# Patient Record
Sex: FEMALE | Race: BLACK OR AFRICAN AMERICAN | NOT HISPANIC OR LATINO | Employment: FULL TIME | URBAN - METROPOLITAN AREA
[De-identification: names, ages, dates, MRNs, and addresses within clinical notes are randomized per-mention and may not be internally consistent; named-entity substitution may affect disease eponyms.]

---

## 2017-03-05 ENCOUNTER — HOSPITAL ENCOUNTER (EMERGENCY)
Facility: HOSPITAL | Age: 32
Discharge: HOME | End: 2017-03-05
Attending: EMERGENCY MEDICINE
Payer: MEDICAID

## 2017-03-05 VITALS
BODY MASS INDEX: 28.29 KG/M2 | HEART RATE: 68 BPM | TEMPERATURE: 98 F | DIASTOLIC BLOOD PRESSURE: 66 MMHG | SYSTOLIC BLOOD PRESSURE: 126 MMHG | OXYGEN SATURATION: 99 % | RESPIRATION RATE: 18 BRPM | WEIGHT: 170 LBS

## 2017-03-05 DIAGNOSIS — R59.0 LYMPHADENOPATHY OF LEFT CERVICAL REGION: ICD-10-CM

## 2017-03-05 DIAGNOSIS — L03.90 CELLULITIS, UNSPECIFIED CELLULITIS SITE: Primary | ICD-10-CM

## 2017-03-05 LAB
B-HCG UR QL: NEGATIVE
CTP QC/QA: YES

## 2017-03-05 PROCEDURE — 69000 DRG XTRNL EAR ABSC/HEM SMPL: CPT

## 2017-03-05 PROCEDURE — 99284 EMERGENCY DEPT VISIT MOD MDM: CPT | Mod: 25

## 2017-03-05 PROCEDURE — 25000003 PHARM REV CODE 250: Performed by: PHYSICIAN ASSISTANT

## 2017-03-05 PROCEDURE — 81025 URINE PREGNANCY TEST: CPT | Performed by: PHYSICIAN ASSISTANT

## 2017-03-05 PROCEDURE — 10160 PNXR ASPIR ABSC HMTMA BULLA: CPT

## 2017-03-05 RX ORDER — HYDROCODONE BITARTRATE AND ACETAMINOPHEN 5; 325 MG/1; MG/1
1 TABLET ORAL 4 TIMES DAILY PRN
Qty: 8 TABLET | Refills: 0 | Status: SHIPPED | OUTPATIENT
Start: 2017-03-05 | End: 2017-08-25

## 2017-03-05 RX ORDER — SULFAMETHOXAZOLE AND TRIMETHOPRIM 800; 160 MG/1; MG/1
1 TABLET ORAL 2 TIMES DAILY
Qty: 20 TABLET | Refills: 0 | Status: SHIPPED | OUTPATIENT
Start: 2017-03-05 | End: 2017-03-15

## 2017-03-05 RX ORDER — LIDOCAINE HYDROCHLORIDE 10 MG/ML
10 INJECTION, SOLUTION EPIDURAL; INFILTRATION; INTRACAUDAL; PERINEURAL
Status: COMPLETED | OUTPATIENT
Start: 2017-03-05 | End: 2017-03-05

## 2017-03-05 RX ORDER — CEPHALEXIN 500 MG/1
500 CAPSULE ORAL 4 TIMES DAILY
Qty: 40 CAPSULE | Refills: 0 | Status: SHIPPED | OUTPATIENT
Start: 2017-03-05 | End: 2017-03-15

## 2017-03-05 RX ORDER — IBUPROFEN 600 MG/1
600 TABLET ORAL
Status: COMPLETED | OUTPATIENT
Start: 2017-03-05 | End: 2017-03-05

## 2017-03-05 RX ORDER — IBUPROFEN 600 MG/1
600 TABLET ORAL EVERY 8 HOURS PRN
Qty: 20 TABLET | Refills: 0 | Status: SHIPPED | OUTPATIENT
Start: 2017-03-05 | End: 2017-08-25

## 2017-03-05 RX ORDER — HYDROCODONE BITARTRATE AND ACETAMINOPHEN 5; 325 MG/1; MG/1
1 TABLET ORAL
Status: COMPLETED | OUTPATIENT
Start: 2017-03-05 | End: 2017-03-05

## 2017-03-05 RX ORDER — CEPHALEXIN 250 MG/1
500 CAPSULE ORAL
Status: COMPLETED | OUTPATIENT
Start: 2017-03-05 | End: 2017-03-05

## 2017-03-05 RX ORDER — SULFAMETHOXAZOLE AND TRIMETHOPRIM 800; 160 MG/1; MG/1
1 TABLET ORAL
Status: COMPLETED | OUTPATIENT
Start: 2017-03-05 | End: 2017-03-05

## 2017-03-05 RX ADMIN — SULFAMETHOXAZOLE AND TRIMETHOPRIM 1 TABLET: 800; 160 TABLET ORAL at 03:03

## 2017-03-05 RX ADMIN — CEPHALEXIN 500 MG: 250 CAPSULE ORAL at 03:03

## 2017-03-05 RX ADMIN — LIDOCAINE HYDROCHLORIDE 100 MG: 10 INJECTION, SOLUTION EPIDURAL; INFILTRATION; INTRACAUDAL; PERINEURAL at 02:03

## 2017-03-05 RX ADMIN — IBUPROFEN 600 MG: 600 TABLET, FILM COATED ORAL at 01:03

## 2017-03-05 RX ADMIN — HYDROCODONE BITARTRATE AND ACETAMINOPHEN 1 TABLET: 5; 325 TABLET ORAL at 03:03

## 2017-03-05 NOTE — ED AVS SNAPSHOT
[facility] [address] [place] [address]               Sunitha Griffin   3/5/2017 12:32 PM   ED    Description:  Female : 1985   Department:  [facility]           Your Care was Coordinated By:     Provider Role From To    Fabien Coates MD Attending Provider 17 1241 --    DERRICK WildC Physician Assistant 17 1240 --      Reason for Visit     swollen glands           Diagnoses this Visit        Comments    Cellulitis, unspecified cellulitis site    -  Primary     Lymphadenopathy of left cervical region           ED Disposition     None           To Do List           Follow-up Information     Follow up with [facility].    Specialty:  Emergency Medicine    Why:  As needed, If symptoms worsen despite 48 hours of antibiotics     Contact information:    [address] [place] [id number] [phone]        Follow up with Meliton Leyva MD.    Specialty:  Otolaryngology    Why:  for ENT evaluation     Contact information:    [address]  [facility] [address] [phone]         These Medications        Disp Refills Start End    hydrocodone-acetaminophen 5-325mg (NORCO) 5-325 mg per tablet 8 tablet 0 3/5/2017     Take 1 tablet by mouth 4 (four) times daily as needed. - Oral    ibuprofen (ADVIL,MOTRIN) 600 MG tablet 20 tablet 0 3/5/2017     Take 1 tablet (600 mg total) by mouth every 8 (eight) hours as needed for Pain. - Oral    sulfamethoxazole-trimethoprim 800-160mg (BACTRIM DS) 800-160 mg Tab 20 tablet 0 3/5/2017 3/15/2017    Take 1 tablet by mouth 2 (two) times daily. - Oral    cephALEXin (KEFLEX) 500 MG capsule 40 capsule 0 3/5/2017 3/15/2017    Take 1 capsule (500 mg total) by mouth 4 (four) times daily. - Oral      [facility]     [facility] On Call Nurse Care Line -  Assistance  Registered nurses in the [facility] On Call Center provide  clinical advisement, health education, appointment booking, and other advisory services.  Call for this free service at [phone].             Medications           Message regarding Medications     Verify the changes and/or additions to your medication regime listed below are the same as discussed with your clinician today.  If any of these changes or additions are incorrect, please notify your healthcare provider.        START taking these NEW medications        Refills    hydrocodone-acetaminophen 5-325mg (NORCO) 5-325 mg per tablet 0    Sig: Take 1 tablet by mouth 4 (four) times daily as needed.    Class: Print    Route: Oral    ibuprofen (ADVIL,MOTRIN) 600 MG tablet 0    Sig: Take 1 tablet (600 mg total) by mouth every 8 (eight) hours as needed for Pain.    Class: Print    Route: Oral    sulfamethoxazole-trimethoprim 800-160mg (BACTRIM DS) 800-160 mg Tab 0    Sig: Take 1 tablet by mouth 2 (two) times daily.    Class: Print    Route: Oral    cephALEXin (KEFLEX) 500 MG capsule 0    Sig: Take 1 capsule (500 mg total) by mouth 4 (four) times daily.    Class: Print    Route: Oral      These medications were administered today        Dose Freq    ibuprofen tablet 600 mg 600 mg ED 1 Time    Sig: Take 1 tablet (600 mg total) by mouth ED 1 Time.    Class: Normal    Route: Oral    lidocaine (PF) 10 mg/ml (1%) injection 100 mg 10 mL ED 1 Time    Sig: 10 mLs (100 mg total) by Infiltration route ED 1 Time.    Class: Normal    Route: Infiltration    sulfamethoxazole-trimethoprim 800-160mg per tablet 1 tablet 1 tablet ED 1 Time    Sig: Take 1 tablet by mouth ED 1 Time.    Class: Normal    Route: Oral    cephALEXin capsule 500 mg 500 mg ED 1 Time    Sig: Take 2 capsules (500 mg total) by mouth ED 1 Time.    Class: Normal    Route: Oral    hydrocodone-acetaminophen 5-325mg per tablet 1 tablet 1 tablet ED 1 Time    Sig: Take 1 tablet by mouth ED 1 Time.    Class: Normal    Route: Oral      STOP taking these medications      indomethacin (INDOCIN) 50 MG capsule Take 1 capsule (50 mg total) by mouth 3 (three) times daily with meals.    albuterol (PROVENTIL) 2.5 mg /3 mL (0.083 %) nebulizer solution Take 2.5 mg by nebulization every 6 (six) hours as needed.             Verify that the below list of medications is an accurate representation of the medications you are currently taking.  If none reported, the list may be blank. If incorrect, please contact your healthcare provider. Carry this list with you in case of emergency.           Current Medications     cephALEXin (KEFLEX) 500 MG capsule Take 1 capsule (500 mg total) by mouth 4 (four) times daily.    cephALEXin capsule 500 mg Take 2 capsules (500 mg total) by mouth ED 1 Time.    hydrocodone-acetaminophen 5-325mg (NORCO) 5-325 mg per tablet Take 1 tablet by mouth 4 (four) times daily as needed.    hydrocodone-acetaminophen 5-325mg per tablet 1 tablet Take 1 tablet by mouth ED 1 Time.    ibuprofen (ADVIL,MOTRIN) 600 MG tablet Take 1 tablet (600 mg total) by mouth every 8 (eight) hours as needed for Pain.    sulfamethoxazole-trimethoprim 800-160mg (BACTRIM DS) 800-160 mg Tab Take 1 tablet by mouth 2 (two) times daily.    sulfamethoxazole-trimethoprim 800-160mg per tablet 1 tablet Take 1 tablet by mouth ED 1 Time.           Clinical Reference Information           Your Vitals Were     BP Pulse Temp Resp Weight Last Period    126/66 (BP Location: Right arm) 68 97.8 °F (36.6 °C) (Oral) 18 77.1 kg (170 lb) 02/26/2017 (Approximate)    SpO2 BMI             99% 28.29 kg/m2         Allergies as of 3/5/2017     No Known Allergies      Immunizations Administered on Date of Encounter - 3/5/2017     None      ED Micro, Lab, POCT     Start Ordered       Status Ordering Provider    03/05/17 1308 03/05/17 1307  POCT urine pregnancy  Once      Final result       ED Imaging Orders     None      Discharge References/Attachments     SKIN INFECTION, CELLULITIS (ENGLISH)    LYMPHADENOPATHY (ENGLISH)       Smoking Cessation     If you would like to quit smoking:   You may be eligible for free services if you are a [place] resident and started smoking cigarettes before September 1, 1988.  Call the Smoking Cessation [employer] (SCT) toll free at (370) 867-7161 or (371) 105-8016.   Call 1-800-QUIT-NOW if you do not meet the above criteria.             [facility] complies with applicable Federal civil rights laws and does not discriminate on the basis of race, color, national origin, age, disability, or sex.        Language Assistance Services     ATTENTION: Language assistance services are available, free of charge. Please call [phone].      ATENCIÓN: Si habla español, tiene a guillory disposición servicios gratuitos de asistencia lingüística. Llame al [phone].     CHÚ Ý: N?u b?n nói Ti?ng Vi?t, có các d?ch v? h? tr? ngôn ng? mi?n phí dành cho b?n. G?i s? [phone].

## 2017-03-06 NOTE — ED PROVIDER NOTES
Encounter Date: 3/5/2017       History     Chief Complaint   Patient presents with    swollen glands     states on yesterday she woke up with soreness and swelling of neck     Review of patient's allergies indicates:  No Known Allergies  HPI Comments: Sunitha Griffin is a 32 y.o. Female presenting for evaluation of swelling, redness and pain to her left sided neck and ear.  Yesterday, she woke up with some swelling and pain, but states it has progressively worsened.  No fever, no chills.  She has noticed no drainage or bleeding from the area.  No sore throat or inner ear pain.  She hasn't taken any medication for her symptoms.  No headache.  No recent illness.     The history is provided by the patient.     Past Medical History:   Diagnosis Date    Anemia     Asthma      History reviewed. No pertinent surgical history.  History reviewed. No pertinent family history.  Social History   Substance Use Topics    Smoking status: Former Smoker    Smokeless tobacco: None    Alcohol use Yes     Review of Systems   Constitutional: Negative for chills and fever.   HENT: Positive for ear pain and facial swelling. Negative for congestion, ear discharge, rhinorrhea, sinus pressure, sore throat and trouble swallowing.    Respiratory: Negative for cough, chest tightness, shortness of breath and wheezing.    Cardiovascular: Negative for chest pain and palpitations.   Musculoskeletal: Positive for myalgias and neck pain. Negative for arthralgias, back pain, joint swelling and neck stiffness.   Skin: Negative for color change, pallor, rash and wound.   Neurological: Negative for weakness and numbness.   Hematological: Does not bruise/bleed easily.       Physical Exam   Initial Vitals   BP Pulse Resp Temp SpO2   03/05/17 1231 03/05/17 1231 03/05/17 1231 03/05/17 1231 03/05/17 1231   126/66 68 18 97.8 °F (36.6 °C) 99 %     Physical Exam    Nursing note and vitals reviewed.  Constitutional: She appears well-developed and  well-nourished. She is not diaphoretic. No distress.   HENT:   Head: Normocephalic and atraumatic.       Right Ear: Hearing, tympanic membrane, external ear and ear canal normal.   Left Ear: Hearing, tympanic membrane and ear canal normal.   Nose: Nose normal.   Mouth/Throat: Uvula is midline, oropharynx is clear and moist and mucous membranes are normal.   Extensive swelling and erythema with induration noted to left inferior and posterior ear.  No active bleeding or discharge.  Displacement of the ear lobe is noted secondary to swelling.    Eyes: Conjunctivae are normal.   Neck: Normal range of motion. Neck supple.   Cardiovascular: Normal rate, regular rhythm, normal heart sounds and intact distal pulses.   Pulmonary/Chest: Breath sounds normal. No respiratory distress. She has no wheezes. She has no rhonchi. She has no rales.   Musculoskeletal: Normal range of motion. She exhibits tenderness. She exhibits no edema.   Lymphadenopathy:     She has cervical adenopathy.   Neurological: She is alert and oriented to person, place, and time. She has normal strength. No sensory deficit.   Skin: Skin is warm and dry. Abscess noted. No rash noted. There is erythema.         ED Course   I & D - Incision and Drainage  Date/Time: 3/5/2017 9:31 PM  Performed by: SHAUNNA COATES  Authorized by: SHAUNNA COATES   Type: abscess  Body area: head/neck  Location details: left external ear  Anesthesia: local infiltration    Anesthesia:  Anesthesia: local infiltration  Local Anesthetic: lidocaine 1% without epinephrine   Description of findings: 18 guage needle aspiration    Patient tolerance: Patient tolerated the procedure well with no immediate complications        Labs Reviewed   POCT URINE PREGNANCY                   APC / Resident Notes:   Her symptoms are most consistent with cellulitis.  There was an area of possible abscess noted to bedside ultrasound, but needle aspiration, performed by Dr. Coates showed  no purulence.  No I&D necessary.  She will be discharged home on Bactrim and Keflex.  She will follow-up with her PCP for re-evaluation in 2-3 days.  She is given specific return precautions.  She voices understanding and is agreeable to the plan.                ED Course     Clinical Impression:   The primary encounter diagnosis was Cellulitis, unspecified cellulitis site. A diagnosis of Lymphadenopathy of left cervical region was also pertinent to this visit.          Brenda Hernandez PA-C  03/05/17 4158

## 2017-08-25 ENCOUNTER — HOSPITAL ENCOUNTER (EMERGENCY)
Facility: HOSPITAL | Age: 32
Discharge: HOME | End: 2017-08-25
Attending: EMERGENCY MEDICINE
Payer: MEDICAID

## 2017-08-25 VITALS
HEART RATE: 71 BPM | OXYGEN SATURATION: 100 % | SYSTOLIC BLOOD PRESSURE: 116 MMHG | RESPIRATION RATE: 16 BRPM | TEMPERATURE: 98 F | DIASTOLIC BLOOD PRESSURE: 65 MMHG

## 2017-08-25 DIAGNOSIS — L24.9 IRRITANT CONTACT DERMATITIS, UNSPECIFIED TRIGGER: Primary | ICD-10-CM

## 2017-08-25 PROCEDURE — 96374 THER/PROPH/DIAG INJ IV PUSH: CPT

## 2017-08-25 PROCEDURE — 99284 EMERGENCY DEPT VISIT MOD MDM: CPT | Mod: 25

## 2017-08-25 PROCEDURE — 96375 TX/PRO/DX INJ NEW DRUG ADDON: CPT

## 2017-08-25 PROCEDURE — 63600175 PHARM REV CODE 636 W HCPCS: Performed by: EMERGENCY MEDICINE

## 2017-08-25 RX ORDER — DIPHENHYDRAMINE HCL 25 MG
25 CAPSULE ORAL EVERY 6 HOURS PRN
Qty: 20 CAPSULE | Refills: 0 | Status: SHIPPED | OUTPATIENT
Start: 2017-08-25 | End: 2017-08-29

## 2017-08-25 RX ORDER — DIPHENHYDRAMINE HYDROCHLORIDE 50 MG/ML
25 INJECTION INTRAMUSCULAR; INTRAVENOUS
Status: COMPLETED | OUTPATIENT
Start: 2017-08-25 | End: 2017-08-25

## 2017-08-25 RX ORDER — METHYLPREDNISOLONE SOD SUCC 125 MG
125 VIAL (EA) INJECTION
Status: COMPLETED | OUTPATIENT
Start: 2017-08-25 | End: 2017-08-25

## 2017-08-25 RX ADMIN — DIPHENHYDRAMINE HYDROCHLORIDE 25 MG: 50 INJECTION, SOLUTION INTRAMUSCULAR; INTRAVENOUS at 05:08

## 2017-08-25 RX ADMIN — METHYLPREDNISOLONE SODIUM SUCCINATE 125 MG: 125 INJECTION, POWDER, FOR SOLUTION INTRAMUSCULAR; INTRAVENOUS at 05:08

## 2017-08-25 NOTE — ED PROVIDER NOTES
Encounter Date: 8/25/2017    SCRIBE #1 NOTE: I, Laine Casey, am scribing for, and in the presence of, Dr. Marie.       History     Chief Complaint   Patient presents with    Rash     started yesterday       08/25/2017 4:21 PM     Chief Complaint: Rash      Sunitha Griffin is a 32 y.o. female with a history of asthma and anemia who presents to the ED with complains of an itching rash with no associating symptoms. Patient states the rash began yesterday on her abdomen and moved to her arms, bilaterally and to back. She endorses working at [employer]. She denies the use of new detergent, being around new dogs, new people, having new clothes, and new furniture. She also denies any other medical concerns during this ED visit. She has used Benadryl and ointment to relieve itching with no improvements. Patient has no pertinent PSHx and no known drug allergies.       The history is provided by the patient.     Review of patient's allergies indicates:  No Known Allergies  Past Medical History:   Diagnosis Date    Anemia     Asthma      History reviewed. No pertinent surgical history.  History reviewed. No pertinent family history.  Social History   Substance Use Topics    Smoking status: Former Smoker    Smokeless tobacco: Never Used    Alcohol use Yes     Review of Systems   Constitutional: Negative for fever.   HENT: Negative for congestion.    Eyes: Negative for visual disturbance.   Respiratory: Negative for wheezing.    Cardiovascular: Negative for chest pain.   Gastrointestinal: Negative for abdominal pain.   Genitourinary: Negative for dysuria.   Musculoskeletal: Negative for joint swelling.   Skin: Positive for rash.   Neurological: Negative for syncope.   Hematological: Does not bruise/bleed easily.   Psychiatric/Behavioral: Negative for confusion.       Physical Exam     Initial Vitals [08/25/17 1607]   BP Pulse Resp Temp SpO2   118/71 74 15 98.2 °F (36.8 °C) 100 %      MAP       86.67          Physical Exam    Constitutional: She appears well-nourished.   HENT:   Head: Normocephalic and atraumatic.   Eyes: Conjunctivae and EOM are normal.   Neck: Normal range of motion. Neck supple. No thyroid mass present.   Cardiovascular: Normal rate and regular rhythm.   Abdominal: Soft. Normal appearance and bowel sounds are normal. There is no tenderness.   Neurological: She is alert and oriented to person, place, and time. She has normal strength. No cranial nerve deficit or sensory deficit.   Skin: Skin is warm and dry. Rash noted. No erythema.   Blanchin, urticarial rash on chest, back, and bilateral upper extremities.   Psychiatric: She has a normal mood and affect. Her speech is normal. Cognition and memory are normal.         ED Course   Procedures  Labs Reviewed - No data to display          Medical Decision Making:   Initial Assessment:   Patient was interviewed and examined and appears be progressing with evidence of irritation dermatitis.  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  IV was established and she was provided Solu-Medrol, Benadryl with significant improvement in rash eruption and pruritus on reassessment.  ED Management:  The patient was educated about contact dermatitis.  Currently she has no concerning hallmarks associated with rash, including purpura or petechia.  She is asked to monitor these symptoms closely and to try to note the environmental trigger.  She'll be provided prescription for Benadryl and asked to take this as needed.  She is asked to follow-up with a dermatologist or primary care doctor soon as possible regarding improvement.  Additionally asked to return to the ER for any new, concerning, or worsening symptoms.  Patient was agreeable to plan follow-up and she was discharged in stable condition.            Scribe Attestation:   Scribe #1: I performed the above scribed service and the  documentation accurately describes the services I performed. I attest to the accuracy of the note.    Attending Attestation:           Physician Attestation for Scribe:  Physician Attestation Statement for Scribe #1: I, Dr. Marie, reviewed documentation, as scribed by Laine Casey in my presence, and it is both accurate and complete.                 ED Course     Clinical Impression:   The encounter diagnosis was Irritant contact dermatitis, unspecified trigger.    Disposition:   Disposition: Discharged  Condition: Stable                        Dexter Marie MD  08/26/17 9632

## 2017-09-13 ENCOUNTER — HOSPITAL ENCOUNTER (EMERGENCY)
Facility: HOSPITAL | Age: 32
Discharge: HOME | End: 2017-09-13
Attending: EMERGENCY MEDICINE
Payer: MEDICAID

## 2017-09-13 VITALS
RESPIRATION RATE: 18 BRPM | OXYGEN SATURATION: 99 % | HEIGHT: 65 IN | HEART RATE: 79 BPM | DIASTOLIC BLOOD PRESSURE: 74 MMHG | TEMPERATURE: 98 F | BODY MASS INDEX: 28.32 KG/M2 | WEIGHT: 170 LBS | SYSTOLIC BLOOD PRESSURE: 113 MMHG

## 2017-09-13 DIAGNOSIS — L30.9 ECZEMA, UNSPECIFIED TYPE: Primary | ICD-10-CM

## 2017-09-13 PROCEDURE — 99283 EMERGENCY DEPT VISIT LOW MDM: CPT

## 2017-09-13 RX ORDER — TRIAMCINOLONE ACETONIDE 1 MG/G
CREAM TOPICAL 2 TIMES DAILY
Qty: 453.6 G | Refills: 0 | Status: SHIPPED | OUTPATIENT
Start: 2017-09-13 | End: 2017-09-23

## 2017-09-13 NOTE — ED PROVIDER NOTES
"Encounter Date: 9/13/2017       History     Chief Complaint   Patient presents with    Rash     seen in this ED 2 weeks ago for same      Patient is a 32 year old female who presents with rash for over two weeks. She has PMH significant for anemia. She reprots she was seen in the ED and given IV steroids and discharged on bendaryl. She reports the steroids helped for about 12 hours. Since then, the rash has worsened with associated "burning" to the skin. She reports using a steroid cream and over the counter lotions with no improvement. She states it has now spread to her entire body. She denied history of eczema. She denied any known irritant.       The history is provided by the patient.     Review of patient's allergies indicates:  No Known Allergies  Past Medical History:   Diagnosis Date    Anemia     Asthma      History reviewed. No pertinent surgical history.  History reviewed. No pertinent family history.  Social History   Substance Use Topics    Smoking status: Former Smoker    Smokeless tobacco: Never Used    Alcohol use Yes     Review of Systems   Constitutional: Negative for fever.   HENT: Negative for congestion and sore throat.    Respiratory: Negative for cough and shortness of breath.    Cardiovascular: Negative for chest pain.   Gastrointestinal: Negative for abdominal pain, diarrhea, nausea and vomiting.   Genitourinary: Negative for dysuria.   Musculoskeletal: Negative for back pain.   Skin: Positive for rash.   Neurological: Negative for weakness.   Hematological: Does not bruise/bleed easily.       Physical Exam     Initial Vitals [09/13/17 1736]   BP Pulse Resp Temp SpO2   113/74 79 18 98.1 °F (36.7 °C) 99 %      MAP       87         Physical Exam    Nursing note and vitals reviewed.  Constitutional: She appears well-developed and well-nourished. No distress.   HENT:   Head: Normocephalic and atraumatic.   Right Ear: External ear normal.   Left Ear: External ear normal.   Nose: Nose " normal.   Eyes: Conjunctivae are normal. Pupils are equal, round, and reactive to light. Right eye exhibits no discharge. Left eye exhibits no discharge.   Neck: Normal range of motion. Neck supple.   Cardiovascular: Normal rate, regular rhythm and normal heart sounds. Exam reveals no gallop and no friction rub.    No murmur heard.  Pulmonary/Chest: Breath sounds normal. She has no wheezes. She has no rhonchi. She has no rales.   Abdominal: Soft. Bowel sounds are normal. There is no tenderness. There is no guarding.   Musculoskeletal: Normal range of motion.   Neurological: She is alert.   Skin: Skin is warm and dry. Rash noted.   Diffuse rash to body, excluding face, consistent with eczema. No hives noted. No skin sloughing. No petechia or purpura.          ED Course   Procedures  Labs Reviewed - No data to display          Medical Decision Making:   History:   I obtained history from: someone other than patient.  Old Medical Records: I decided to obtain old medical records.       APC / Resident Notes:   This is an urgent evaluation of a 32 year old female who presents with rash for over two weeks that has progressively worsened. Rash consistent with eczema. Not consistent with urticaria. Will try triamcinolone ointment and is instructed to see dermatology. No skin sloughing. No petechia or purpura. No airway compromise.               ED Course      Clinical Impression:   The encounter diagnosis was Eczema, unspecified type.                           Cassie Alcantar PA-C  09/13/17 0205

## 2017-09-13 NOTE — ED NOTES
Pt reports rash over entire body that is unrelieved by OTC medications. Pt states she wants her skin to be taken off. Obvious redness where pt has been scratching all over her body.

## 2018-11-14 ENCOUNTER — HOSPITAL ENCOUNTER (EMERGENCY)
Facility: HOSPITAL | Age: 33
Discharge: HOME | End: 2018-11-14
Attending: EMERGENCY MEDICINE

## 2018-11-14 VITALS
SYSTOLIC BLOOD PRESSURE: 114 MMHG | BODY MASS INDEX: 22.5 KG/M2 | HEART RATE: 61 BPM | DIASTOLIC BLOOD PRESSURE: 78 MMHG | RESPIRATION RATE: 20 BRPM | WEIGHT: 140 LBS | TEMPERATURE: 99 F | HEIGHT: 66 IN | OXYGEN SATURATION: 100 %

## 2018-11-14 DIAGNOSIS — L03.116 LEFT LEG CELLULITIS: Primary | ICD-10-CM

## 2018-11-14 LAB
B-HCG UR QL: NEGATIVE
CTP QC/QA: YES

## 2018-11-14 PROCEDURE — 99283 EMERGENCY DEPT VISIT LOW MDM: CPT

## 2018-11-14 PROCEDURE — 81025 URINE PREGNANCY TEST: CPT | Performed by: PHYSICIAN ASSISTANT

## 2018-11-14 RX ORDER — CEPHALEXIN 500 MG/1
500 CAPSULE ORAL 4 TIMES DAILY
Qty: 28 CAPSULE | Refills: 0 | Status: SHIPPED | OUTPATIENT
Start: 2018-11-14 | End: 2018-11-21

## 2018-11-15 NOTE — DISCHARGE INSTRUCTIONS
Take antibiotics as prescribed.  Take tylenol or motrin as needed for pain.  Follow up with your primary care provider.  For worsening symptoms, chest pain, shortness of breath, increased abdominal pain, high grade fever, stroke or stroke like symptoms, immediately go to the nearest Emergency Room or call 911 as soon as possible.

## 2018-11-15 NOTE — ED NOTES
Discharge instructions, diagnosis, medications, and follow up discussed with patient. Patient verbalized understanding. All questions and concerns answered. No needs expressed at the time. Pt is awake, alert and oriented with no acute distress noted. Respirations even and unlabored. Ambulatory out of ED.

## 2018-11-15 NOTE — ED NOTES
Pt presents to ED c/o lump to upper posterior thigh x 2 weeks. Indurated area noted. No erythema or area of fluctuance noted. AAOx4. Skin otherwise warm and dry. Ambulatory. Updated on POC and reports understanding. Call bell placed at bedside.

## 2018-11-15 NOTE — ED PROVIDER NOTES
"Encounter Date: 11/14/2018    SCRIBE #1 NOTE: I, Caitlin Humberto, am scribing for, and in the presence of, Cassie Alcantar PA-C.       History     Chief Complaint   Patient presents with    Abscess     Rt upper thigh - reports s/s x 2 weeks - states "not coming to a head"/possible cellulitis (not observed in triage)       Time seen by provider: 7:02 PM on 11/14/2018    Sunitha Griffin is a 33 y.o. female who presents to the ED with an onset of a wound on the back of her left thigh that she noticed 2 week sago. Pt explains that it went away and then came back bigger in size. She endorsed a fever, but it has resolved. The patient denies wound drainage or any other symptoms at this time. No pertinent SHx noted. No known drug allergies noted.       The history is provided by the patient.     Review of patient's allergies indicates:  No Known Allergies  Past Medical History:   Diagnosis Date    Anemia     Asthma      History reviewed. No pertinent surgical history.  History reviewed. No pertinent family history.  Social History     Tobacco Use    Smoking status: Former Smoker    Smokeless tobacco: Never Used   Substance Use Topics    Alcohol use: Yes    Drug use: No     Review of Systems   Constitutional: Positive for fever (Resolved). Negative for activity change, appetite change and chills.   HENT: Negative for congestion, rhinorrhea and sore throat.    Eyes: Negative for redness and visual disturbance.   Respiratory: Negative for cough, chest tightness and shortness of breath.    Cardiovascular: Negative for chest pain.   Gastrointestinal: Negative for abdominal pain, diarrhea, nausea and vomiting.   Genitourinary: Negative for dysuria and frequency.   Musculoskeletal: Negative for back pain, neck pain and neck stiffness.   Skin: Positive for wound. Negative for rash.        Negative for wound drainage.   Neurological: Negative for dizziness, syncope, weakness, numbness and headaches.   Hematological: " Does not bruise/bleed easily.       Physical Exam     Initial Vitals [11/14/18 1822]   BP Pulse Resp Temp SpO2   114/78 61 20 98.7 °F (37.1 °C) 100 %      MAP       --         Physical Exam    Nursing note and vitals reviewed.  Constitutional: She appears well-developed and well-nourished. She is cooperative.  Non-toxic appearance. She does not have a sickly appearance.   HENT:   Head: Normocephalic and atraumatic.   Right Ear: External ear normal.   Left Ear: External ear normal.   Nose: Nose normal.   Mouth/Throat: Oropharynx is clear and moist.   Eyes: Conjunctivae and lids are normal. Pupils are equal, round, and reactive to light.   Neck: Normal range of motion and full passive range of motion without pain. Neck supple.   Cardiovascular: Normal rate, regular rhythm and normal heart sounds. Exam reveals no gallop and no friction rub.    No murmur heard.  Pulmonary/Chest: Breath sounds normal. She has no wheezes. She has no rhonchi. She has no rales.   Abdominal: Soft. Normal appearance. There is no tenderness. There is no rigidity, no rebound and no guarding.   Musculoskeletal:        Left upper leg: She exhibits tenderness.   Neurological: She is alert and oriented to person, place, and time.   Skin: Skin is warm, dry and intact. Abscess (Well healing) noted. No rash noted. No erythema.   2 cm area of induration to the left posterior thigh. No significant erythema. Mild tenderness. No fluctuance.          ED Course   Procedures  Labs Reviewed   POCT URINE PREGNANCY          Imaging Results    None          Medical Decision Making:   History:   Old Medical Records: I decided to obtain old medical records.  Clinical Tests:   Lab Tests: Ordered and Reviewed       APC / Resident Notes:   This is an emergent evaluation of a 33 year old female with complaint of abscess to the left posterior thigh. Patient is noted to have a small area of induration and tenderness. No fluctuance. Bedside US showed no sign of fluid  collection or identifiable abscess to drain. No I&D required. Patient was given instructions on wound care. Antibiotics given. Follow up with primary care provider. Return precautions given. All questions answered. Case was discussed with Dr. Oreilly who is in agreement with the plan of care.          Scribe Attestation:   Scribe #1: I performed the above scribed service and the documentation accurately describes the services I performed. I attest to the accuracy of the note.    I, Cassie Alcantar PA-C, personally performed the services described in this documentation. All medical record entries made by the scribe were at my direction and in my presence.  I have reviewed the chart and agree that the record reflects my personal performance and is accurate and complete. Cassie Alcantar PA-C.  11:28 PM 11/20/2018             Clinical Impression:   The encounter diagnosis was Left leg cellulitis.      Disposition:   Disposition: Discharged  Condition: Stable                        Cassie Alcantar PA-C  11/14/18 [id number]       Cassie Alcantar PA-C  11/20/18 6327

## 2019-04-02 ENCOUNTER — HOSPITAL ENCOUNTER (EMERGENCY)
Facility: HOSPITAL | Age: 34
Discharge: HOME | End: 2019-04-02
Attending: EMERGENCY MEDICINE

## 2019-04-02 VITALS
TEMPERATURE: 99 F | DIASTOLIC BLOOD PRESSURE: 72 MMHG | SYSTOLIC BLOOD PRESSURE: 117 MMHG | HEIGHT: 66 IN | HEART RATE: 69 BPM | BODY MASS INDEX: 23.3 KG/M2 | WEIGHT: 145 LBS | OXYGEN SATURATION: 100 % | RESPIRATION RATE: 16 BRPM

## 2019-04-02 DIAGNOSIS — L02.91 ABSCESS: Primary | ICD-10-CM

## 2019-04-02 PROCEDURE — 10060 I&D ABSCESS SIMPLE/SINGLE: CPT

## 2019-04-02 PROCEDURE — 99283 EMERGENCY DEPT VISIT LOW MDM: CPT | Mod: 25

## 2019-04-02 PROCEDURE — 25000003 PHARM REV CODE 250: Performed by: EMERGENCY MEDICINE

## 2019-04-02 RX ORDER — LIDOCAINE HYDROCHLORIDE AND EPINEPHRINE 10; 10 MG/ML; UG/ML
10 INJECTION, SOLUTION INFILTRATION; PERINEURAL
Status: COMPLETED | OUTPATIENT
Start: 2019-04-02 | End: 2019-04-02

## 2019-04-02 RX ORDER — SULFAMETHOXAZOLE AND TRIMETHOPRIM 800; 160 MG/1; MG/1
1 TABLET ORAL 2 TIMES DAILY
Qty: 14 TABLET | Refills: 0 | Status: SHIPPED | OUTPATIENT
Start: 2019-04-02 | End: 2019-04-09

## 2019-04-02 RX ADMIN — LIDOCAINE HYDROCHLORIDE,EPINEPHRINE BITARTRATE 10 ML: 10; .01 INJECTION, SOLUTION INFILTRATION; PERINEURAL at 09:04

## 2019-04-03 NOTE — ED PROVIDER NOTES
Encounter Date: 4/2/2019    SCRIBE #1 NOTE: I, Pineda Springer, am scribing for, and in the presence of, Dr. Hagen.       History     Chief Complaint   Patient presents with    Abscess     left posterior upper thigh     04/02/2019 9:30 PM    The pt is a 34 y.o. female with a past medical history of anemia presenting to the ED with a gradual onset of an acute L thigh pain beginning 1 day ago. The pt stated she was seen in the ED 1 month ago diagnosed with an abscess on the back of her L thigh which resolved with antibiotics. She reported the abscess represented 1 day ago which was painful with yellowish and bloody drainage. The pt stated L thigh pain is exacerbated with sitting and movement. She has a history of cigarette smoking. The pt has no past surgical history on file.    The history is provided by the patient.     Review of patient's allergies indicates:   Allergen Reactions    Iodine and iodide containing products      Past Medical History:   Diagnosis Date    Anemia     Asthma      No past surgical history on file.  No family history on file.  Social History     Tobacco Use    Smoking status: Former Smoker    Smokeless tobacco: Never Used   Substance Use Topics    Alcohol use: Yes    Drug use: No     Review of Systems   Constitutional: Negative for activity change, appetite change, chills, fatigue and fever.   Eyes: Negative for visual disturbance.   Respiratory: Negative for apnea and shortness of breath.    Cardiovascular: Negative for chest pain and palpitations.   Gastrointestinal: Negative for abdominal distention and abdominal pain.   Genitourinary: Negative for difficulty urinating.   Musculoskeletal: Negative for neck pain.        + L thigh pain   Skin: Negative for pallor and rash.   Neurological: Negative for headaches.   Hematological: Does not bruise/bleed easily.   Psychiatric/Behavioral: Negative for agitation.       Physical Exam     Initial Vitals [04/02/19 2036]   BP Pulse Resp Temp SpO2    117/72 69 16 99.1 °F (37.3 °C) 100 %      MAP       --         Physical Exam    Nursing note and vitals reviewed.  Constitutional: She appears well-developed and well-nourished.  Non-toxic appearance. No distress.   HENT:   Head: Normocephalic and atraumatic.   Eyes: EOM are normal. Pupils are equal, round, and reactive to light.   Neck: Normal range of motion. Neck supple. No neck rigidity. No JVD present.   Cardiovascular: Normal rate, regular rhythm, normal heart sounds and intact distal pulses. Exam reveals no gallop and no friction rub.    No murmur heard.  Pulmonary/Chest: Breath sounds normal. She has no wheezes. She has no rhonchi. She has no rales.   Abdominal: Soft. Bowel sounds are normal. She exhibits no distension. There is no tenderness. There is no rigidity, no rebound and no guarding.   Musculoskeletal: Normal range of motion.   Mild tenderness within the gluteal fold   Neurological: She is alert and oriented to person, place, and time. She has normal strength and normal reflexes. No cranial nerve deficit or sensory deficit. She exhibits normal muscle tone. Coordination normal. GCS eye subscore is 4. GCS verbal subscore is 5. GCS motor subscore is 6.   Skin:   1cm x 2cm area of fluctuance within the gluteal fold  No erythema  No induration  No warmth   Psychiatric: She has a normal mood and affect. Her speech is normal and behavior is normal. She is not actively hallucinating.         ED Course   I & D - Incision and Drainage  Date/Time: 4/2/2019 11:46 PM  Performed by: Gene Hagen MD  Authorized by: Gene Hagen MD   Consent Done: Not Needed  Type: abscess  Body area: lower extremity  Location details: left leg  Anesthesia: see MAR for details  Patient sedated: no  Scalpel size: 11  Incision type: single straight  Complexity: simple  Drainage: pus  Drainage amount: moderate  Wound treatment: incision,  expression of material and  deloculation  Complications: No  Patient tolerance:  Patient tolerated the procedure well with no immediate complications        Labs Reviewed - No data to display       Imaging Results    None          Medical Decision Making:   History:   Old Medical Records: I decided to obtain old medical records.  Initial Assessment:   This is an emergent evaluation of a patient with complaints of a skin infection.     I decided to obtain and review the old medical record.    Clinically the patient has an abscess. The patient's abscess has been incised and drained.  The patient will be placed on antibiotics.  The patient has no signs of systemic symptoms or significant cellulitis to warrant admission at this time.  The patient has been instructed to follow up with their regular doctor or the emergency department.  I've given the patient specific return precautions.    Wound care has been discussed.      The results and physical exam findings were reviewed with the patient. Pt agrees with assessment, disposition and treatment plan and has no further questions or complaints at this time.    Gene Hagen M.D. 11:47 PM 4/2/2019                  Scribe Attestation:   Scribe #1: I performed the above scribed service and the documentation accurately describes the services I performed. I attest to the accuracy of the note.    I, Hieu Castano, personally performed the services described in this documentation. All medical record entries made by the scribe were at my direction and in my presence.  I have reviewed the chart and agree that the record reflects my personal performance and is accurate and complete. Gene Hagen MD.  11:48 PM 04/02/2019       DISCLAIMER: This note was prepared with [employer] Flu[employer] Direct voice recognition transcription software. Garbled syntax, mangled pronouns, and other bizarre constructions may be attributed to that software system.             Clinical Impression:       ICD-10-CM ICD-9-CM   1. Abscess L02.91 682.9         Disposition:    Disposition: Discharged  Condition: Stable                        Gene Hagen MD  04/02/19 [id number]

## 2021-06-10 ENCOUNTER — HOSPITAL ENCOUNTER (EMERGENCY)
Facility: HOSPITAL | Age: 36
Discharge: HOME | End: 2021-06-10
Attending: EMERGENCY MEDICINE

## 2021-06-10 VITALS
TEMPERATURE: 98 F | OXYGEN SATURATION: 99 % | RESPIRATION RATE: 17 BRPM | WEIGHT: 155 LBS | BODY MASS INDEX: 25.02 KG/M2 | DIASTOLIC BLOOD PRESSURE: 72 MMHG | HEART RATE: 60 BPM | SYSTOLIC BLOOD PRESSURE: 121 MMHG

## 2021-06-10 DIAGNOSIS — R56.9 SEIZURE: Primary | ICD-10-CM

## 2021-06-10 LAB
ALBUMIN SERPL BCP-MCNC: 4.3 G/DL (ref 3.5–5.2)
ALP SERPL-CCNC: 44 U/L (ref 55–135)
ALT SERPL W/O P-5'-P-CCNC: 12 U/L (ref 10–44)
ANION GAP SERPL CALC-SCNC: 15 MMOL/L (ref 8–16)
AST SERPL-CCNC: 21 U/L (ref 10–40)
B-HCG UR QL: NEGATIVE
BASOPHILS # BLD AUTO: 0.06 K/UL (ref 0–0.2)
BASOPHILS NFR BLD: 1 % (ref 0–1.9)
BILIRUB SERPL-MCNC: 1 MG/DL (ref 0.1–1)
BUN SERPL-MCNC: 11 MG/DL (ref 6–20)
CALCIUM SERPL-MCNC: 9.1 MG/DL (ref 8.7–10.5)
CHLORIDE SERPL-SCNC: 103 MMOL/L (ref 95–110)
CO2 SERPL-SCNC: 22 MMOL/L (ref 23–29)
CREAT SERPL-MCNC: 1.2 MG/DL (ref 0.5–1.4)
CTP QC/QA: YES
DIFFERENTIAL METHOD: ABNORMAL
EOSINOPHIL # BLD AUTO: 0 K/UL (ref 0–0.5)
EOSINOPHIL NFR BLD: 0.5 % (ref 0–8)
ERYTHROCYTE [DISTWIDTH] IN BLOOD BY AUTOMATED COUNT: 12.9 % (ref 11.5–14.5)
EST. GFR  (AFRICAN AMERICAN): >60 ML/MIN/1.73 M^2
EST. GFR  (NON AFRICAN AMERICAN): 58.7 ML/MIN/1.73 M^2
GLUCOSE SERPL-MCNC: 91 MG/DL (ref 70–110)
HCT VFR BLD AUTO: 38.4 % (ref 37–48.5)
HGB BLD-MCNC: 12.5 G/DL (ref 12–16)
IMM GRANULOCYTES # BLD AUTO: 0.03 K/UL (ref 0–0.04)
IMM GRANULOCYTES NFR BLD AUTO: 0.5 % (ref 0–0.5)
LYMPHOCYTES # BLD AUTO: 2.2 K/UL (ref 1–4.8)
LYMPHOCYTES NFR BLD: 35.4 % (ref 18–48)
MCH RBC QN AUTO: 31.3 PG (ref 27–31)
MCHC RBC AUTO-ENTMCNC: 32.6 G/DL (ref 32–36)
MCV RBC AUTO: 96 FL (ref 82–98)
MONOCYTES # BLD AUTO: 0.7 K/UL (ref 0.3–1)
MONOCYTES NFR BLD: 11.5 % (ref 4–15)
NEUTROPHILS # BLD AUTO: 3.2 K/UL (ref 1.8–7.7)
NEUTROPHILS NFR BLD: 51.1 % (ref 38–73)
NRBC BLD-RTO: 0 /100 WBC
PLATELET # BLD AUTO: 183 K/UL (ref 150–450)
PMV BLD AUTO: 12 FL (ref 9.2–12.9)
POTASSIUM SERPL-SCNC: 3.3 MMOL/L (ref 3.5–5.1)
PROT SERPL-MCNC: 7.1 G/DL (ref 6–8.4)
RBC # BLD AUTO: 3.99 M/UL (ref 4–5.4)
SODIUM SERPL-SCNC: 140 MMOL/L (ref 136–145)
WBC # BLD AUTO: 6.19 K/UL (ref 3.9–12.7)

## 2021-06-10 PROCEDURE — 81025 URINE PREGNANCY TEST: CPT

## 2021-06-10 PROCEDURE — 96365 THER/PROPH/DIAG IV INF INIT: CPT

## 2021-06-10 PROCEDURE — 80053 COMPREHEN METABOLIC PANEL: CPT

## 2021-06-10 PROCEDURE — 85025 COMPLETE CBC W/AUTO DIFF WBC: CPT

## 2021-06-10 PROCEDURE — 99284 EMERGENCY DEPT VISIT MOD MDM: CPT | Mod: 25

## 2021-06-10 PROCEDURE — 63600175 PHARM REV CODE 636 W HCPCS

## 2021-06-10 PROCEDURE — 96375 TX/PRO/DX INJ NEW DRUG ADDON: CPT

## 2021-06-10 RX ORDER — KETOROLAC TROMETHAMINE 30 MG/ML
10 INJECTION, SOLUTION INTRAMUSCULAR; INTRAVENOUS
Status: COMPLETED | OUTPATIENT
Start: 2021-06-10 | End: 2021-06-10

## 2021-06-10 RX ORDER — LEVETIRACETAM 10 MG/ML
1000 INJECTION INTRAVASCULAR
Status: COMPLETED | OUTPATIENT
Start: 2021-06-10 | End: 2021-06-10

## 2021-06-10 RX ADMIN — LEVETIRACETAM INJECTION 1000 MG: 10 INJECTION INTRAVENOUS at 03:06

## 2021-06-10 RX ADMIN — KETOROLAC TROMETHAMINE 10 MG: 30 INJECTION, SOLUTION INTRAMUSCULAR at 03:06

## 2022-04-14 ENCOUNTER — HOSPITAL ENCOUNTER (EMERGENCY)
Facility: HOSPITAL | Age: 37
Discharge: HOME | End: 2022-04-14
Attending: EMERGENCY MEDICINE

## 2022-04-14 VITALS
SYSTOLIC BLOOD PRESSURE: 135 MMHG | OXYGEN SATURATION: 100 % | DIASTOLIC BLOOD PRESSURE: 64 MMHG | BODY MASS INDEX: 21.97 KG/M2 | HEIGHT: 67 IN | TEMPERATURE: 98 F | RESPIRATION RATE: 18 BRPM | HEART RATE: 51 BPM | WEIGHT: 140 LBS

## 2022-04-14 DIAGNOSIS — K04.7 DENTAL ABSCESS: Primary | ICD-10-CM

## 2022-04-14 LAB
ALBUMIN SERPL BCP-MCNC: 4 G/DL (ref 3.5–5.2)
ALP SERPL-CCNC: 53 U/L (ref 55–135)
ALT SERPL W/O P-5'-P-CCNC: 13 U/L (ref 10–44)
ANION GAP SERPL CALC-SCNC: 5 MMOL/L (ref 8–16)
AST SERPL-CCNC: 19 U/L (ref 10–40)
B-HCG UR QL: NEGATIVE
BASOPHILS # BLD AUTO: 0.03 K/UL (ref 0–0.2)
BASOPHILS NFR BLD: 0.5 % (ref 0–1.9)
BILIRUB SERPL-MCNC: 1.2 MG/DL (ref 0.1–1)
BUN SERPL-MCNC: 9 MG/DL (ref 6–20)
CALCIUM SERPL-MCNC: 9 MG/DL (ref 8.7–10.5)
CHLORIDE SERPL-SCNC: 103 MMOL/L (ref 95–110)
CO2 SERPL-SCNC: 25 MMOL/L (ref 23–29)
CREAT SERPL-MCNC: 0.8 MG/DL (ref 0.5–1.4)
CREAT SERPL-MCNC: 0.8 MG/DL (ref 0.5–1.4)
CTP QC/QA: YES
DIFFERENTIAL METHOD: ABNORMAL
EOSINOPHIL # BLD AUTO: 0 K/UL (ref 0–0.5)
EOSINOPHIL NFR BLD: 0.2 % (ref 0–8)
ERYTHROCYTE [DISTWIDTH] IN BLOOD BY AUTOMATED COUNT: 13.7 % (ref 11.5–14.5)
EST. GFR  (AFRICAN AMERICAN): >60 ML/MIN/1.73 M^2
EST. GFR  (NON AFRICAN AMERICAN): >60 ML/MIN/1.73 M^2
GLUCOSE SERPL-MCNC: 86 MG/DL (ref 70–110)
HCT VFR BLD AUTO: 36.1 % (ref 37–48.5)
HGB BLD-MCNC: 12.1 G/DL (ref 12–16)
IMM GRANULOCYTES # BLD AUTO: 0.02 K/UL (ref 0–0.04)
IMM GRANULOCYTES NFR BLD AUTO: 0.3 % (ref 0–0.5)
LYMPHOCYTES # BLD AUTO: 1.4 K/UL (ref 1–4.8)
LYMPHOCYTES NFR BLD: 24.3 % (ref 18–48)
MCH RBC QN AUTO: 30.4 PG (ref 27–31)
MCHC RBC AUTO-ENTMCNC: 33.5 G/DL (ref 32–36)
MCV RBC AUTO: 91 FL (ref 82–98)
MONOCYTES # BLD AUTO: 0.5 K/UL (ref 0.3–1)
MONOCYTES NFR BLD: 8.4 % (ref 4–15)
NEUTROPHILS # BLD AUTO: 3.8 K/UL (ref 1.8–7.7)
NEUTROPHILS NFR BLD: 66.3 % (ref 38–73)
NRBC BLD-RTO: 0 /100 WBC
PLATELET # BLD AUTO: 151 K/UL (ref 150–450)
PMV BLD AUTO: 12.2 FL (ref 9.2–12.9)
POTASSIUM SERPL-SCNC: 4.2 MMOL/L (ref 3.5–5.1)
PROT SERPL-MCNC: 6.8 G/DL (ref 6–8.4)
RBC # BLD AUTO: 3.98 M/UL (ref 4–5.4)
SAMPLE: NORMAL
SODIUM SERPL-SCNC: 133 MMOL/L (ref 136–145)
WBC # BLD AUTO: 5.8 K/UL (ref 3.9–12.7)

## 2022-04-14 PROCEDURE — 85025 COMPLETE CBC W/AUTO DIFF WBC: CPT | Performed by: STUDENT IN AN ORGANIZED HEALTH CARE EDUCATION/TRAINING PROGRAM

## 2022-04-14 PROCEDURE — 80053 COMPREHEN METABOLIC PANEL: CPT | Performed by: STUDENT IN AN ORGANIZED HEALTH CARE EDUCATION/TRAINING PROGRAM

## 2022-04-14 PROCEDURE — 25500020 PHARM REV CODE 255: Performed by: STUDENT IN AN ORGANIZED HEALTH CARE EDUCATION/TRAINING PROGRAM

## 2022-04-14 PROCEDURE — 25000003 PHARM REV CODE 250: Performed by: STUDENT IN AN ORGANIZED HEALTH CARE EDUCATION/TRAINING PROGRAM

## 2022-04-14 PROCEDURE — 81025 URINE PREGNANCY TEST: CPT | Performed by: STUDENT IN AN ORGANIZED HEALTH CARE EDUCATION/TRAINING PROGRAM

## 2022-04-14 PROCEDURE — 99285 EMERGENCY DEPT VISIT HI MDM: CPT | Mod: 25

## 2022-04-14 RX ORDER — PENICILLIN V POTASSIUM 500 MG/1
500 TABLET, FILM COATED ORAL EVERY 6 HOURS
Qty: 40 TABLET | Refills: 0 | Status: SHIPPED | OUTPATIENT
Start: 2022-04-14 | End: 2022-04-24

## 2022-04-14 RX ORDER — IBUPROFEN 200 MG
400 TABLET ORAL EVERY 6 HOURS PRN
Qty: 30 TABLET | Refills: 0
Start: 2022-04-14

## 2022-04-14 RX ORDER — ACETAMINOPHEN 500 MG
1000 TABLET ORAL 3 TIMES DAILY PRN
Qty: 30 TABLET | Refills: 0
Start: 2022-04-14

## 2022-04-14 RX ADMIN — IOHEXOL 100 ML: 350 INJECTION, SOLUTION INTRAVENOUS at 12:04

## 2022-04-14 RX ADMIN — SODIUM CHLORIDE 1000 ML: 9 INJECTION, SOLUTION INTRAVENOUS at 11:04

## 2022-04-14 NOTE — Clinical Note
"Sunitha Horvath" Elias was seen and treated in our emergency department on 4/14/2022.  She may return to work on 04/16/2022.       If you have any questions or concerns, please don't hesitate to call.       RN    "

## 2022-04-14 NOTE — ED PROVIDER NOTES
Encounter Date: 4/14/2022       History     Chief Complaint   Patient presents with    Dental Pain     37-year-old female history significant for anemia, asthma presenting to the emergency room for evaluation of tooth pain.  Patient states 3 days ago, had onset of right lower tooth and jaw pain, with progressively worsening swelling to the right lower jaw.  She is still able to swallow, having no difficulty breathing.  She has had no fevers.  States she was previously had a dental abscess in the same location, however never had her teeth fixed.  She does not smoke, she does occasionally use alcohol.  She does not use illicit drugs.        Review of patient's allergies indicates:  No Active Allergies  Past Medical History:   Diagnosis Date    Anemia     Asthma      No past surgical history on file.  No family history on file.  Social History     Tobacco Use    Smoking status: Former Smoker    Smokeless tobacco: Never Used   Substance Use Topics    Alcohol use: Yes    Drug use: No     Review of Systems   Constitutional: Negative for chills, fatigue and fever.   HENT: Negative for congestion, hearing loss, sore throat and trouble swallowing.    Eyes: Negative for visual disturbance.   Respiratory: Negative for cough, chest tightness and shortness of breath.    Cardiovascular: Negative for chest pain.   Gastrointestinal: Negative for abdominal pain and nausea.   Endocrine: Negative for polyuria.   Genitourinary: Negative for difficulty urinating.   Musculoskeletal: Negative for arthralgias and myalgias.   Skin: Negative for rash.   Neurological: Negative for dizziness and headaches.   Psychiatric/Behavioral: The patient is not nervous/anxious.    All other systems reviewed and are negative.      Physical Exam     Initial Vitals [04/14/22 1009]   BP Pulse Resp Temp SpO2   119/79 (!) 58 18 97.8 °F (36.6 °C) 99 %      MAP       --         Physical Exam    Nursing note and vitals reviewed.  Constitutional: She  appears well-developed and well-nourished.   HENT:   Head: Normocephalic and atraumatic.   Teeth are in poor oral repair, with significant deterioration to teeth numbers 31, 32. There is significant gum disease surrounding these teeth, with notable erythema, induration.    External Ear is without lesions or tenderness. No discoloration, edema, or tenderness of the mastoid. EACs nonobstructed, without swelling or erythema. TM b/l is pearly gray and translucent with anterior/inferior light reflex and nondistorted landmarks. TM b/l is mobile. No evidence of middle ear effusion. Patient can lateralize sound with no decreased hearing b/l.   Nose is midline without lesions. Nasal passages patent. Mucosa is pink and moist without hemorrhage or lesions.   No maxillary or frontal sinus tenderness.   Lips, tongue, and oral mucosa are pink and moist without lesions. Tonsils are Grade 0 and equal with midline uvula.      Eyes: EOM are normal. Pupils are equal, round, and reactive to light.   Neck: Neck supple.   Cardiovascular: Normal rate, regular rhythm and normal heart sounds.   No murmur heard.  Pulmonary/Chest: Breath sounds normal. No respiratory distress.   Musculoskeletal:         General: Normal range of motion.      Cervical back: Neck supple.     Neurological: She is alert and oriented to person, place, and time. GCS score is 15. GCS eye subscore is 4. GCS verbal subscore is 5. GCS motor subscore is 6.   Skin: Skin is warm and dry. Capillary refill takes less than 2 seconds.   Psychiatric: She has a normal mood and affect. Her behavior is normal. Judgment and thought content normal.         ED Course   Procedures  Labs Reviewed   CBC W/ AUTO DIFFERENTIAL - Abnormal; Notable for the following components:       Result Value    RBC 3.98 (*)     Hematocrit 36.1 (*)     All other components within normal limits   COMPREHENSIVE METABOLIC PANEL - Abnormal; Notable for the following components:    Sodium 133 (*)     Total  Bilirubin 1.2 (*)     Alkaline Phosphatase 53 (*)     Anion Gap 5 (*)     All other components within normal limits   POCT URINE PREGNANCY   ISTAT CREATININE   POCT CREATININE          Imaging Results          CT Soft Tissue Neck With Contrast (Final result)  Result time 04/14/22 12:45:55    Final result by Chi Kinsey MD (04/14/22 12:45:55)                 Narrative:    CMS MANDATED QUALITY DATA - CT RADIATION 436    All CT scans at this facility utilize dose modulation, iterative reconstruction, and/or weight based dosing when appropriate to reduce radiation dose to as low as reasonably achievable.    CLINICAL HISTORY:  37 years (1985) Female Neck abscess, deep tissue Patient is able to tolerate Iodine Contrast/ per physician.  Patient states that allergy is to fish.  ams; Wire marker on skin at site of swelling/lump    TECHNIQUE:  CT NECK WITH IV CONTRAST. 342 images obtained. Axial CT images of the soft tissue of the neck were obtained from the skull base to the thoracic inlet after the uneventful administration of IV contrast. Sagittal and coronal reformatted images are available for review. This examination does not assess for ligamentous injury or stability.    COMPARISON:  None available.    FINDINGS:  Soft tissues: There is a marker (palpable region) along the right mandibular body. There is moderate adjacent subcutaneous soft tissue fat stranding and edema. There is apical lucency along the roots of the posterior most mandibular molars on the right. In addition there may be a small area of cortical breakthrough along the lateral aspect of the mandibular body (axial image 39) with a tiny focal area of enhancement suggestive of a subperiosteal abscess measuring 1 x 3 mm (axial image 39)  Skull base: The visualized globes and orbits, paranasal sinuses, mastoid air cells and skull base are within normal limits.  Aerodigestive: Evaluation of the aerodigestive tract demonstrates no exophytic mass,  nor areas of focal mass effect.  The parapharyngeal fat triangle is clear. The prevertebral soft tissues are within normal limits.  Lymph nodes: Evaluation of the cervical lymph chains demonstrate no pathologic lymphadenopathy by imaging criteria.  Glands: The parotid, submandibular, tonsil and thyroid glands appear within normal limits.  Osseous: There is moderate disc height loss at C5-C6 and C6-C7 with mild disc height loss at C3-C4 and C4-C5.  Vasculature: Grossly normal in course and caliber with no large vessel occlusion or high-grade stenosis identified.  Lung apices: Clear.    IMPRESSION:  1. Focal area of soft tissue swelling and edema over the right mandibular body.  2. Apical lucency along the roots of the two posterior most mandibular molars.  3. Findings suspicious for a tiny echogenic subperiosteal abscess along the right second posterior most mandibular molar root.            Electronically signed by:  Chi Kinsey MD  4/14/2022 12:45 PM CDT Workstation: [id number]                               Medications   sodium chloride 0.9% bolus 1,000 mL (0 mLs Intravenous Stopped 4/14/22 1149)   iohexoL (OMNIPAQUE 350) injection 100 mL (100 mLs Intravenous Given 4/14/22 1213)     Medical Decision Making:   Initial Assessment:   37-year-old female history significant for anemia, asthma presenting to the emergency room for evaluation of tooth pain.  Patient states 3 days ago, had onset of right lower tooth and jaw pain, with progressively worsening swelling to the right lower jaw.  She is still able to swallow, having no difficulty breathing.  She has had no fevers.  States she was previously had a dental abscess in the same location, however never had her teeth fixed.  She does not smoke, she does occasionally use alcohol.  She does not use illicit drugs.  Differential Diagnosis:   Abscess versus cellulitis versus periodontal disease  ED Management:  37-year-old female presents as above for evaluation of  tooth pain.  CT demonstrates very tiny abscess, not likely to be amenable to drainage.  Patient is afebrile, not tachycardic.  Vital signs are stable, she is hemodynamically stable and in no acute distress.  I will discharge patient home on course of antibiotics and recommend follow-up with dentist for more permanent solution.    Disposition:  Stable, discharge.  Plan to discharge home with appropriate follow-up, including primary care manager.  Will discharge with prescription for penicillin VK    I discussed the findings and plan of care with this patient.  All questions were answered to the patient's satisfaction.  Disposition plan as above.  Verbal and written discharge instructions provided to the patient on discharge.  Return precautions discussed prior to discharge.     I discuss this patient case with the cosigning physician, who agrees with diagnosis and plan of care. This note was written using the assistance of a dictation program and may contain grammatical errors.                       Clinical Impression:   Final diagnoses:  [K04.7] Dental abscess (Primary)          ED Disposition Condition    Discharge Stable        ED Prescriptions     Medication Sig Dispense Start Date End Date Auth. Provider    ibuprofen (ADVIL,MOTRIN) 200 MG tablet Take 2 tablets (400 mg total) by mouth every 6 (six) hours as needed for Pain. 30 tablet 4/14/2022  Mark Ramachandran PA-C    acetaminophen (TYLENOL) 500 MG tablet Take 2 tablets (1,000 mg total) by mouth 3 (three) times daily as needed for Pain. 30 tablet 4/14/2022  Mark Ramachandran PA-C    penicillin v potassium (VEETID) 500 MG tablet Take 1 tablet (500 mg total) by mouth every 6 (six) hours. for 10 days 40 tablet 4/14/2022 4/24/2022 Mark Ramachandran PA-C        Follow-up Information     Follow up With Specialties Details Why Contact Info      In 1 week      Dentist  Call today             Mark Ramachandran PA-C  04/14/22 5300